# Patient Record
Sex: FEMALE | Race: BLACK OR AFRICAN AMERICAN | NOT HISPANIC OR LATINO | Employment: UNEMPLOYED | URBAN - METROPOLITAN AREA
[De-identification: names, ages, dates, MRNs, and addresses within clinical notes are randomized per-mention and may not be internally consistent; named-entity substitution may affect disease eponyms.]

---

## 2021-07-22 ENCOUNTER — LAB (OUTPATIENT)
Dept: LAB | Facility: HOSPITAL | Age: 21
End: 2021-07-22

## 2021-07-22 ENCOUNTER — OFFICE VISIT (OUTPATIENT)
Dept: INTERNAL MEDICINE | Facility: CLINIC | Age: 21
End: 2021-07-22

## 2021-07-22 VITALS
HEIGHT: 63 IN | OXYGEN SATURATION: 98 % | DIASTOLIC BLOOD PRESSURE: 84 MMHG | TEMPERATURE: 96.8 F | SYSTOLIC BLOOD PRESSURE: 126 MMHG | WEIGHT: 119 LBS | HEART RATE: 68 BPM | RESPIRATION RATE: 16 BRPM | BODY MASS INDEX: 21.09 KG/M2

## 2021-07-22 DIAGNOSIS — Z13.220 LIPID SCREENING: ICD-10-CM

## 2021-07-22 DIAGNOSIS — Z13.21 ENCOUNTER FOR VITAMIN DEFICIENCY SCREENING: ICD-10-CM

## 2021-07-22 DIAGNOSIS — Z13.29 THYROID DISORDER SCREENING: ICD-10-CM

## 2021-07-22 DIAGNOSIS — F41.9 ANXIETY: ICD-10-CM

## 2021-07-22 DIAGNOSIS — G47.09 OTHER INSOMNIA: ICD-10-CM

## 2021-07-22 DIAGNOSIS — Z13.1 SCREENING FOR DIABETES MELLITUS: ICD-10-CM

## 2021-07-22 DIAGNOSIS — R79.89 LOW TSH LEVEL: Primary | ICD-10-CM

## 2021-07-22 DIAGNOSIS — Z13.0 SCREENING FOR BLOOD DISEASE: ICD-10-CM

## 2021-07-22 DIAGNOSIS — F31.62 BIPOLAR 1 DISORDER, MIXED, MODERATE (HCC): Primary | ICD-10-CM

## 2021-07-22 LAB
25(OH)D3 SERPL-MCNC: 24.5 NG/ML (ref 30–100)
ALBUMIN SERPL-MCNC: 4.3 G/DL (ref 3.5–5.2)
ALBUMIN/GLOB SERPL: 1.6 G/DL
ALP SERPL-CCNC: 46 U/L (ref 39–117)
ALT SERPL W P-5'-P-CCNC: 7 U/L (ref 1–33)
ANION GAP SERPL CALCULATED.3IONS-SCNC: 10.2 MMOL/L (ref 5–15)
AST SERPL-CCNC: 16 U/L (ref 1–32)
BILIRUB SERPL-MCNC: 0.5 MG/DL (ref 0–1.2)
BUN SERPL-MCNC: 5 MG/DL (ref 6–20)
BUN/CREAT SERPL: 5.8 (ref 7–25)
CALCIUM SPEC-SCNC: 9.3 MG/DL (ref 8.6–10.5)
CHLORIDE SERPL-SCNC: 102 MMOL/L (ref 98–107)
CHOLEST SERPL-MCNC: 160 MG/DL (ref 0–200)
CO2 SERPL-SCNC: 26.8 MMOL/L (ref 22–29)
CREAT SERPL-MCNC: 0.86 MG/DL (ref 0.57–1)
DEPRECATED RDW RBC AUTO: 47.1 FL (ref 37–54)
ERYTHROCYTE [DISTWIDTH] IN BLOOD BY AUTOMATED COUNT: 13.2 % (ref 12.3–15.4)
FOLATE SERPL-MCNC: 15.2 NG/ML (ref 4.78–24.2)
GFR SERPL CREATININE-BSD FRML MDRD: 101 ML/MIN/1.73
GLOBULIN UR ELPH-MCNC: 2.7 GM/DL
GLUCOSE SERPL-MCNC: 95 MG/DL (ref 65–99)
HBA1C MFR BLD: 5 % (ref 4.8–5.6)
HCT VFR BLD AUTO: 45.7 % (ref 34–46.6)
HDLC SERPL-MCNC: 72 MG/DL (ref 40–60)
HGB BLD-MCNC: 15.5 G/DL (ref 12–15.9)
LDLC SERPL CALC-MCNC: 79 MG/DL (ref 0–100)
LDLC/HDLC SERPL: 1.12 {RATIO}
MCH RBC QN AUTO: 32.5 PG (ref 26.6–33)
MCHC RBC AUTO-ENTMCNC: 33.9 G/DL (ref 31.5–35.7)
MCV RBC AUTO: 95.8 FL (ref 79–97)
PLATELET # BLD AUTO: 189 10*3/MM3 (ref 140–450)
PMV BLD AUTO: 9.9 FL (ref 6–12)
POTASSIUM SERPL-SCNC: 4 MMOL/L (ref 3.5–5.2)
PROT SERPL-MCNC: 7 G/DL (ref 6–8.5)
RBC # BLD AUTO: 4.77 10*6/MM3 (ref 3.77–5.28)
SODIUM SERPL-SCNC: 139 MMOL/L (ref 136–145)
T3FREE SERPL-MCNC: 2.8 PG/ML (ref 2–4.4)
T4 FREE SERPL-MCNC: 1.15 NG/DL (ref 0.93–1.7)
TRIGL SERPL-MCNC: 37 MG/DL (ref 0–150)
TSH SERPL DL<=0.05 MIU/L-ACNC: 0.16 UIU/ML (ref 0.27–4.2)
VIT B12 BLD-MCNC: 401 PG/ML (ref 211–946)
VLDLC SERPL-MCNC: 9 MG/DL (ref 5–40)
WBC # BLD AUTO: 4.31 10*3/MM3 (ref 3.4–10.8)

## 2021-07-22 PROCEDURE — 83036 HEMOGLOBIN GLYCOSYLATED A1C: CPT | Performed by: NURSE PRACTITIONER

## 2021-07-22 PROCEDURE — 84481 FREE ASSAY (FT-3): CPT | Performed by: NURSE PRACTITIONER

## 2021-07-22 PROCEDURE — 80061 LIPID PANEL: CPT | Performed by: NURSE PRACTITIONER

## 2021-07-22 PROCEDURE — 82607 VITAMIN B-12: CPT | Performed by: NURSE PRACTITIONER

## 2021-07-22 PROCEDURE — 82746 ASSAY OF FOLIC ACID SERUM: CPT | Performed by: NURSE PRACTITIONER

## 2021-07-22 PROCEDURE — 82306 VITAMIN D 25 HYDROXY: CPT | Performed by: NURSE PRACTITIONER

## 2021-07-22 PROCEDURE — 80050 GENERAL HEALTH PANEL: CPT | Performed by: NURSE PRACTITIONER

## 2021-07-22 PROCEDURE — 99214 OFFICE O/P EST MOD 30 MIN: CPT | Performed by: NURSE PRACTITIONER

## 2021-07-22 PROCEDURE — 84439 ASSAY OF FREE THYROXINE: CPT | Performed by: NURSE PRACTITIONER

## 2021-07-22 RX ORDER — LAMOTRIGINE 25 MG/1
TABLET ORAL
Qty: 42 TABLET | Refills: 0 | Status: SHIPPED | OUTPATIENT
Start: 2021-07-22 | End: 2021-08-12

## 2021-07-22 RX ORDER — OLANZAPINE 5 MG/1
5 TABLET ORAL NIGHTLY
Qty: 30 TABLET | Refills: 1 | Status: SHIPPED | OUTPATIENT
Start: 2021-07-22 | End: 2021-09-15

## 2021-07-23 RX ORDER — ERGOCALCIFEROL 1.25 MG/1
50000 CAPSULE ORAL WEEKLY
Qty: 4 CAPSULE | Refills: 2 | Status: SHIPPED | OUTPATIENT
Start: 2021-07-23 | End: 2022-07-03 | Stop reason: SDUPTHER

## 2021-07-23 RX ORDER — DIPHENOXYLATE HYDROCHLORIDE AND ATROPINE SULFATE 2.5; .025 MG/1; MG/1
1 TABLET ORAL DAILY
Qty: 30 TABLET | Refills: 11 | Status: SHIPPED | OUTPATIENT
Start: 2021-07-23 | End: 2022-07-03 | Stop reason: SDUPTHER

## 2021-08-09 ENCOUNTER — TELEPHONE (OUTPATIENT)
Dept: INTERNAL MEDICINE | Facility: CLINIC | Age: 21
End: 2021-08-09

## 2021-08-09 NOTE — TELEPHONE ENCOUNTER
Genesight test unable to be resulted, Pt had a Genesight test in June ordered by previous PCP. A signed record release would need to be sent to get results.

## 2021-08-12 ENCOUNTER — TELEMEDICINE (OUTPATIENT)
Dept: INTERNAL MEDICINE | Facility: CLINIC | Age: 21
End: 2021-08-12

## 2021-08-12 DIAGNOSIS — G47.09 OTHER INSOMNIA: ICD-10-CM

## 2021-08-12 DIAGNOSIS — F41.9 ANXIETY: ICD-10-CM

## 2021-08-12 DIAGNOSIS — F31.62 BIPOLAR 1 DISORDER, MIXED, MODERATE (HCC): Primary | ICD-10-CM

## 2021-08-12 DIAGNOSIS — F31.62 BIPOLAR 1 DISORDER, MIXED, MODERATE (HCC): ICD-10-CM

## 2021-08-12 PROCEDURE — 99214 OFFICE O/P EST MOD 30 MIN: CPT | Performed by: NURSE PRACTITIONER

## 2021-08-12 RX ORDER — LAMOTRIGINE 25 MG/1
TABLET ORAL
Qty: 42 TABLET | Refills: 0 | Status: SHIPPED | OUTPATIENT
Start: 2021-08-12 | End: 2022-05-10

## 2021-08-12 NOTE — PROGRESS NOTES
Subjective   Chief Complaint   Patient presents with   • Anxiety   • Depression     This is video visit.  You have chosen to receive care through a video visit today. Do you consent to use a video visit for your medical care today? Yes     Elayne Garibay is a 21 y.o. female here today for anxiety, depression, and insomnia. During last appt she was started on lamictal and zyprexa. Medications have helped with mood stabilization and sadness. She feels she is able to manage better than she was. She has never responded to medications well and cannot find any to work for her. She is sleeping better since stating zyprexa at bedtime. She does not want to change anything or increase the dose at this time. She is currently seeing a therapist and does not want to see psychiatrist. Does not like telling her story over and over and wants to stay with this provider for medications. No thoughts of self harm or harming others.     I have reviewed the following portions of the patient's history and confirmed they are accurate: allergies, current medications, past family history, past medical history, past social history, past surgical history and problem list    I have personally completed the patient's review of systems.    Review of Systems   Constitutional: Negative for activity change, appetite change, chills, diaphoresis, fatigue, fever, unexpected weight gain and unexpected weight loss.   HENT: Negative for ear discharge, ear pain, mouth sores, nosebleeds, sinus pressure, sneezing and sore throat.    Eyes: Negative for pain, discharge and itching.   Respiratory: Negative for cough, chest tightness, shortness of breath and wheezing.    Cardiovascular: Negative for chest pain, palpitations and leg swelling.   Gastrointestinal: Negative for abdominal pain, constipation, diarrhea, nausea and vomiting.   Endocrine: Negative for heat intolerance, polydipsia and polyphagia.   Genitourinary: Negative for dysuria, flank pain,  frequency, hematuria and urgency.   Musculoskeletal: Negative for arthralgias, back pain, gait problem, joint swelling, myalgias, neck pain and neck stiffness.   Skin: Negative for color change, pallor and rash.   Allergic/Immunologic: Negative for immunocompromised state.   Neurological: Negative for seizures, speech difficulty, weakness and numbness.   Hematological: Negative for adenopathy.   Psychiatric/Behavioral: Positive for depressed mood and stress. Negative for agitation, behavioral problems, decreased concentration, dysphoric mood, hallucinations, self-injury, sleep disturbance, suicidal ideas and negative for hyperactivity. The patient is nervous/anxious.        Current Outpatient Medications on File Prior to Visit   Medication Sig   • multivitamin (Multiple Vitamin) tablet tablet Take 1 tablet by mouth Daily.   • OLANZapine (ZyPREXA) 5 MG tablet Take 1 tablet by mouth Every Night.   • vitamin D (ERGOCALCIFEROL) 1.25 MG (96477 UT) capsule capsule Take 1 capsule by mouth 1 (One) Time Per Week.     No current facility-administered medications on file prior to visit.       Objective   There were no vitals filed for this visit.  There is no height or weight on file to calculate BMI.    Physical Exam  Constitutional:       Appearance: Normal appearance. She is well-developed.   HENT:      Head: Normocephalic and atraumatic.      Nose: Nose normal.   Eyes:      General: Lids are normal.      Conjunctiva/sclera: Conjunctivae normal.   Neck:      Trachea: Trachea normal.   Pulmonary:      Effort: No respiratory distress.   Neurological:      Mental Status: She is alert and oriented to person, place, and time.      GCS: GCS eye subscore is 4. GCS verbal subscore is 5. GCS motor subscore is 6.   Psychiatric:         Attention and Perception: Attention normal.         Mood and Affect: Mood normal.         Speech: Speech normal.         Behavior: Behavior normal. Behavior is cooperative.         Thought Content:  Thought content normal.         Assessment/Plan   Problem List Items Addressed This Visit        Mental Health    Bipolar 1 disorder, mixed, moderate (CMS/HCC) - Primary  Chronic issue unstable requiring medication management and monitoring. Will eat well balanced diet, increase water intake, increase physical activity during the day, and get adequate rest. Discussed relaxation and coping skills and exercises.   Continue lamictal and zyprexa at bedtime. Consider increasing lamictal if mood becomes unstable.       Anxiety  Chronic issue unstable requiring medication management and monitoring. Will eat well balanced diet, increase water intake, increase physical activity during the day, and get adequate rest. Discussed relaxation and coping skills and exercises.   Continue zyprexa and lamictal. Continue behavioral therapy.          Sleep    Other insomnia  Chronic issue stable requiring medication management and monitoring. Will eat well balanced diet, drink adequate water decreasing caffeine intake, and increase physical activity during the day. Discussed relaxation and coping skills and exercises. Discussed sleep hygiene and limiting electronic devices prior to bedtime.    Continue zyprexa at bedtime. Discussed adding OTC melatonin PRN.              Current Outpatient Medications:   •  lamoTRIgine (LaMICtal) 25 MG tablet, TAKE 1 TABLET BY MOUTH ONCE DAILY FOR 2 WEEKS, THEN INCREASE TO 2 TABLETS ONCE DAILY., Disp: 42 tablet, Rfl: 0  •  multivitamin (Multiple Vitamin) tablet tablet, Take 1 tablet by mouth Daily., Disp: 30 tablet, Rfl: 11  •  OLANZapine (ZyPREXA) 5 MG tablet, Take 1 tablet by mouth Every Night., Disp: 30 tablet, Rfl: 1  •  vitamin D (ERGOCALCIFEROL) 1.25 MG (41742 UT) capsule capsule, Take 1 capsule by mouth 1 (One) Time Per Week., Disp: 4 capsule, Rfl: 2       Plan of care reviewed with the patient at the conclusion of today's visit.  Education was provided regarding diagnosis, management, and any  prescribed or recommended OTC medications.  Patient verbalized understanding of and agreement with management plan.     Return in about 1 month (around 9/12/2021), or if symptoms worsen or fail to improve.     I spent 25 minutes in medical discussion with patient during this visit.     JUMA Melendez    Please note that portions of this note were completed with a voice recognition program. Efforts were made to edit the dictations, but occasionally words are mistranscribed.

## 2021-09-15 DIAGNOSIS — F41.9 ANXIETY: ICD-10-CM

## 2021-09-15 DIAGNOSIS — G47.09 OTHER INSOMNIA: ICD-10-CM

## 2021-09-15 DIAGNOSIS — F31.62 BIPOLAR 1 DISORDER, MIXED, MODERATE (HCC): ICD-10-CM

## 2021-09-15 RX ORDER — OLANZAPINE 5 MG/1
TABLET ORAL
Qty: 30 TABLET | Refills: 1 | Status: SHIPPED | OUTPATIENT
Start: 2021-09-15 | End: 2022-05-10

## 2022-05-10 ENCOUNTER — OFFICE VISIT (OUTPATIENT)
Dept: INTERNAL MEDICINE | Facility: CLINIC | Age: 22
End: 2022-05-10

## 2022-05-10 ENCOUNTER — LAB (OUTPATIENT)
Dept: LAB | Facility: HOSPITAL | Age: 22
End: 2022-05-10

## 2022-05-10 VITALS
OXYGEN SATURATION: 99 % | BODY MASS INDEX: 23.21 KG/M2 | TEMPERATURE: 98.4 F | DIASTOLIC BLOOD PRESSURE: 62 MMHG | SYSTOLIC BLOOD PRESSURE: 109 MMHG | WEIGHT: 131 LBS | HEIGHT: 63 IN | HEART RATE: 58 BPM | RESPIRATION RATE: 16 BRPM

## 2022-05-10 DIAGNOSIS — R41.89 CHANGES IN CONSCIOUSNESS: ICD-10-CM

## 2022-05-10 DIAGNOSIS — F31.62 BIPOLAR 1 DISORDER, MIXED, MODERATE: Primary | ICD-10-CM

## 2022-05-10 DIAGNOSIS — R41.0 CONFUSION: ICD-10-CM

## 2022-05-10 DIAGNOSIS — R79.89 LOW TSH LEVEL: ICD-10-CM

## 2022-05-10 DIAGNOSIS — R40.4 STARING EPISODES: ICD-10-CM

## 2022-05-10 DIAGNOSIS — Z79.899 HIGH RISK MEDICATION USE: ICD-10-CM

## 2022-05-10 PROBLEM — F29 PSYCHOSIS: Status: ACTIVE | Noted: 2022-02-16

## 2022-05-10 LAB
BASOPHILS # BLD AUTO: 0.02 10*3/MM3 (ref 0–0.2)
BASOPHILS NFR BLD AUTO: 0.5 % (ref 0–1.5)
DEPRECATED RDW RBC AUTO: 40.3 FL (ref 37–54)
EOSINOPHIL # BLD AUTO: 0.14 10*3/MM3 (ref 0–0.4)
EOSINOPHIL NFR BLD AUTO: 3.7 % (ref 0.3–6.2)
ERYTHROCYTE [DISTWIDTH] IN BLOOD BY AUTOMATED COUNT: 12.3 % (ref 12.3–15.4)
HCT VFR BLD AUTO: 39.5 % (ref 34–46.6)
HGB BLD-MCNC: 13.3 G/DL (ref 12–15.9)
IMM GRANULOCYTES # BLD AUTO: 0.01 10*3/MM3 (ref 0–0.05)
IMM GRANULOCYTES NFR BLD AUTO: 0.3 % (ref 0–0.5)
LITHIUM SERPL-SCNC: 0.5 MMOL/L (ref 0.6–1.2)
LYMPHOCYTES # BLD AUTO: 1.88 10*3/MM3 (ref 0.7–3.1)
LYMPHOCYTES NFR BLD AUTO: 49.6 % (ref 19.6–45.3)
MCH RBC QN AUTO: 30.8 PG (ref 26.6–33)
MCHC RBC AUTO-ENTMCNC: 33.7 G/DL (ref 31.5–35.7)
MCV RBC AUTO: 91.4 FL (ref 79–97)
MONOCYTES # BLD AUTO: 0.28 10*3/MM3 (ref 0.1–0.9)
MONOCYTES NFR BLD AUTO: 7.4 % (ref 5–12)
NEUTROPHILS NFR BLD AUTO: 1.46 10*3/MM3 (ref 1.7–7)
NEUTROPHILS NFR BLD AUTO: 38.5 % (ref 42.7–76)
NRBC BLD AUTO-RTO: 0 /100 WBC (ref 0–0.2)
PLATELET # BLD AUTO: 175 10*3/MM3 (ref 140–450)
PMV BLD AUTO: 10 FL (ref 6–12)
RBC # BLD AUTO: 4.32 10*6/MM3 (ref 3.77–5.28)
WBC NRBC COR # BLD: 3.79 10*3/MM3 (ref 3.4–10.8)

## 2022-05-10 PROCEDURE — 80050 GENERAL HEALTH PANEL: CPT | Performed by: NURSE PRACTITIONER

## 2022-05-10 PROCEDURE — 82746 ASSAY OF FOLIC ACID SERUM: CPT | Performed by: NURSE PRACTITIONER

## 2022-05-10 PROCEDURE — 84481 FREE ASSAY (FT-3): CPT | Performed by: NURSE PRACTITIONER

## 2022-05-10 PROCEDURE — 80178 ASSAY OF LITHIUM: CPT | Performed by: NURSE PRACTITIONER

## 2022-05-10 PROCEDURE — 99214 OFFICE O/P EST MOD 30 MIN: CPT | Performed by: NURSE PRACTITIONER

## 2022-05-10 PROCEDURE — 84439 ASSAY OF FREE THYROXINE: CPT | Performed by: NURSE PRACTITIONER

## 2022-05-10 PROCEDURE — 82607 VITAMIN B-12: CPT | Performed by: NURSE PRACTITIONER

## 2022-05-10 PROCEDURE — 82306 VITAMIN D 25 HYDROXY: CPT | Performed by: NURSE PRACTITIONER

## 2022-05-10 RX ORDER — LITHIUM CARBONATE 300 MG/1
300 TABLET, FILM COATED, EXTENDED RELEASE ORAL 2 TIMES DAILY
COMMUNITY
Start: 2022-04-04 | End: 2023-01-19

## 2022-05-10 RX ORDER — RISPERIDONE 2 MG/1
2 TABLET ORAL
COMMUNITY
Start: 2022-04-04 | End: 2023-01-19

## 2022-05-10 RX ORDER — LUMATEPERONE 42 MG/1
1 CAPSULE ORAL DAILY
COMMUNITY
Start: 2022-05-06 | End: 2023-01-19

## 2022-05-10 NOTE — PROGRESS NOTES
"Subjective   Chief Complaint   Patient presents with   • Loss of Consciousness     Pt has not had lithuim level checked recently   • Behavioral Health     \"I would like a referral for outpatient that accepts my insurance\"      Elayne Garibay is a 22 y.o. female.   The patient is here today for loss of consciousness and would like a behavioral health referral. She is accompanied by her mother.     Loss of consciousness  She explains feeling an aura that comes over her and then she becomes weak, until she almost falls to the ground. She denies losing consciousness but describes she is unable to speak or react when these episodes happen. The patient's mother describes the patient looking disoriented when these episodes occur and states she is unresponsive. At first, the patient believed she was having a seizure. These episodes have happened in the past a few times intermittently. She attributed these to situations of extreme stress, the most recent episode happening after there was a wasp in the home she was running away from.     Behavioral health  The patient has a history of Bipolar I disorder and psychosis. She is no longer taking Lamictal or Zyprexa willingly in 2021, and was started on new medication. The patient is now taking Caplyta, lithium, and Risperdal, all prescribed by a psychiatrist with [facility]. She started taking these medications approximately 1 month ago. Since starting her new medications she has not been able to tell a difference in her symptoms; however, admits she forgets to take them occasionally. Of note, she takes her new medications more times than not. She denies her psychiatrist monitoring her lithium levels regularly. She does not have a follow-up appointment with her psychiatrist but has her phone number in case she needs anything. She denies any suicidal ideations at this time. The patient would like a referral to see a new behavioral health provider for more consistent " care. Her mother would like her to been seen by a behavioral health psychiatrist and a therapist in-person. The patient is requesting to have her head examined further for behavioral health issues. Of note, she reports being claustrophobic.     I have reviewed the following portions of the patient's history and confirmed they are accurate: allergies, current medications, past family history, past medical history, past social history, past surgical history, and problem list    I have personally completed the patient's review of systems.    Review of Systems   Constitutional: Positive for activity change. Negative for appetite change, chills, diaphoresis, fatigue, fever, unexpected weight gain and unexpected weight loss.   HENT: Negative for ear discharge, ear pain, mouth sores, nosebleeds, sinus pressure, sneezing and sore throat.    Eyes: Negative for pain, discharge and itching.   Respiratory: Negative for cough, chest tightness, shortness of breath and wheezing.    Cardiovascular: Negative for chest pain, palpitations and leg swelling.   Gastrointestinal: Negative for abdominal pain, constipation, diarrhea, nausea and vomiting.   Endocrine: Negative for heat intolerance, polydipsia and polyphagia.   Genitourinary: Negative for dysuria, flank pain, frequency, hematuria and urgency.   Musculoskeletal: Negative for arthralgias, back pain, gait problem, joint swelling, myalgias, neck pain and neck stiffness.   Skin: Negative for color change, pallor and rash.   Allergic/Immunologic: Negative for immunocompromised state.   Neurological: Positive for weakness, memory problem and confusion. Negative for seizures, speech difficulty and numbness.   Hematological: Negative for adenopathy.   Psychiatric/Behavioral: Positive for depressed mood and stress. Negative for agitation, behavioral problems, decreased concentration, dysphoric mood, hallucinations, self-injury, sleep disturbance, suicidal ideas and negative for  "hyperactivity. The patient is nervous/anxious.        Current Outpatient Medications on File Prior to Visit   Medication Sig   • Caplyta 42 MG capsule Take 1 capsule by mouth Daily.   • lithium (LITHOBID) 300 MG CR tablet Take 300 mg by mouth 2 (Two) Times a Day.   • risperiDONE (risperDAL) 2 MG tablet Take 2 mg by mouth every night at bedtime.   • [DISCONTINUED] lamoTRIgine (LaMICtal) 25 MG tablet TAKE 1 TABLET BY MOUTH ONCE DAILY FOR 2 WEEKS, THEN INCREASE TO 2 TABLETS ONCE DAILY.   • multivitamin (Multiple Vitamin) tablet tablet Take 1 tablet by mouth Daily.   • vitamin D (ERGOCALCIFEROL) 1.25 MG (32682 UT) capsule capsule Take 1 capsule by mouth 1 (One) Time Per Week.   • [DISCONTINUED] OLANZapine (zyPREXA) 5 MG tablet TAKE 1 TABLET BY MOUTH EVERY DAY AT NIGHT     No current facility-administered medications on file prior to visit.       Objective   Vitals:    05/10/22 0925   BP: 109/62   Pulse: 58   Resp: 16   Temp: 98.4 °F (36.9 °C)   TempSrc: Temporal   SpO2: 99%   Weight: 59.4 kg (131 lb)   Height: 160 cm (63\")     Body mass index is 23.21 kg/m².    Physical Exam  Vitals reviewed.   Constitutional:       Appearance: Normal appearance. She is well-developed.   HENT:      Head: Normocephalic and atraumatic.      Nose: Nose normal.   Eyes:      General: Lids are normal.      Conjunctiva/sclera: Conjunctivae normal.      Pupils: Pupils are equal, round, and reactive to light.   Neck:      Thyroid: No thyromegaly.      Trachea: Trachea normal.   Pulmonary:      Effort: Pulmonary effort is normal. No respiratory distress.   Skin:     General: Skin is warm and dry.   Neurological:      Mental Status: She is alert and oriented to person, place, and time.      GCS: GCS eye subscore is 4. GCS verbal subscore is 5. GCS motor subscore is 6.   Psychiatric:         Attention and Perception: She is inattentive.         Mood and Affect: Mood normal. Affect is flat.         Behavior: Behavior normal. Behavior is cooperative. "         Cognition and Memory: Memory is impaired.      Comments: Slow and pressure speech     Today patient appears very lethargic, inattentive with pressured speech    [unfilled]  Problem List Items Addressed This Visit        Mental Health    Bipolar 1 disorder, mixed, moderate (HCC) - Primary    Relevant Medications    Caplyta 42 MG capsule    risperiDONE (risperDAL) 2 MG tablet    lithium (LITHOBID) 300 MG CR tablet    Other Relevant Orders    Ambulatory Referral to Psychiatry (Completed)      Other Visit Diagnoses     Confusion        Relevant Medications    Caplyta 42 MG capsule    risperiDONE (risperDAL) 2 MG tablet    lithium (LITHOBID) 300 MG CR tablet    Other Relevant Orders    MRI Brain Without Contrast    Ambulatory Referral to Neurology (Completed)    Changes in consciousness        Relevant Orders    CBC Auto Differential    Comprehensive Metabolic Panel    TSH Rfx On Abnormal To Free T4    Vitamin B12 & Folate    Vitamin D 25 Hydroxy    Lithium level    MRI Brain Without Contrast    Ambulatory Referral to Neurology (Completed)    Staring episodes        Relevant Orders    MRI Brain Without Contrast    Ambulatory Referral to Neurology (Completed)    High risk medication use        Relevant Orders    Lithium level         1. Bipolar 1 disorder  - Chronic, unstable.   - Patient will continue Risperdal, lithium, and Caplyta.   - Referring to behavioral health for psychiatry and therapy.   - Educated and discussed with patient importance of taking her medication daily and not missing doses.     2. Confusion  - New, unstable.   - Ordering brain MRI and referring to neurology.   - Confusion and symptoms could be related to psychiatric illness and not being consistent with her medications.  - Labs ordered.     3. Changes in consciousness   - New, unstable.   - Ordering brain MRI and referring to neurology.   - Confusion and symptoms could be related to psychiatric illness and not being consistent  with her medications.    4. Staring episodes  - New, unstable.   - Ordering brain MRI and referring to neurology.   - Confusion and symptoms could be related to psychiatric illness and not being consistent with her medications.        Current Outpatient Medications:   •  Caplyta 42 MG capsule, Take 1 capsule by mouth Daily., Disp: , Rfl:   •  lithium (LITHOBID) 300 MG CR tablet, Take 300 mg by mouth 2 (Two) Times a Day., Disp: , Rfl:   •  risperiDONE (risperDAL) 2 MG tablet, Take 2 mg by mouth every night at bedtime., Disp: , Rfl:   •  multivitamin (Multiple Vitamin) tablet tablet, Take 1 tablet by mouth Daily., Disp: 30 tablet, Rfl: 11  •  vitamin D (ERGOCALCIFEROL) 1.25 MG (38271 UT) capsule capsule, Take 1 capsule by mouth 1 (One) Time Per Week., Disp: 4 capsule, Rfl: 2       Plan of care reviewed with the patient at the conclusion of today's visit.  Education was provided regarding diagnosis, management, and any prescribed or recommended OTC medications.  Patient verbalized understanding of and agreement with management plan.     No follow-ups on file.      Transcribed from ambient dictation for JUMA Melendez by Mae Asencio.   05/10/22   09:47 EDT    Patient verbalized consent to the visit recording.

## 2022-05-11 DIAGNOSIS — R79.89 LOW TSH LEVEL: Primary | ICD-10-CM

## 2022-05-11 LAB
25(OH)D3 SERPL-MCNC: 27.8 NG/ML (ref 30–100)
ALBUMIN SERPL-MCNC: 3.7 G/DL (ref 3.5–5.2)
ALBUMIN/GLOB SERPL: 1.4 G/DL
ALP SERPL-CCNC: 42 U/L (ref 39–117)
ALT SERPL W P-5'-P-CCNC: 9 U/L (ref 1–33)
ANION GAP SERPL CALCULATED.3IONS-SCNC: 13.5 MMOL/L (ref 5–15)
AST SERPL-CCNC: 14 U/L (ref 1–32)
BILIRUB SERPL-MCNC: 0.2 MG/DL (ref 0–1.2)
BUN SERPL-MCNC: 6 MG/DL (ref 6–20)
BUN/CREAT SERPL: 7.4 (ref 7–25)
CALCIUM SPEC-SCNC: 9.4 MG/DL (ref 8.6–10.5)
CHLORIDE SERPL-SCNC: 103 MMOL/L (ref 98–107)
CO2 SERPL-SCNC: 22.5 MMOL/L (ref 22–29)
CREAT SERPL-MCNC: 0.81 MG/DL (ref 0.57–1)
EGFRCR SERPLBLD CKD-EPI 2021: 105.4 ML/MIN/1.73
FOLATE SERPL-MCNC: 17.7 NG/ML (ref 4.78–24.2)
GLOBULIN UR ELPH-MCNC: 2.7 GM/DL
GLUCOSE SERPL-MCNC: 74 MG/DL (ref 65–99)
POTASSIUM SERPL-SCNC: 4.4 MMOL/L (ref 3.5–5.2)
PROT SERPL-MCNC: 6.4 G/DL (ref 6–8.5)
SODIUM SERPL-SCNC: 139 MMOL/L (ref 136–145)
T3FREE SERPL-MCNC: 2.42 PG/ML (ref 2–4.4)
T4 FREE SERPL-MCNC: 1.08 NG/DL (ref 0.93–1.7)
TSH SERPL DL<=0.05 MIU/L-ACNC: 0.11 UIU/ML (ref 0.27–4.2)
VIT B12 BLD-MCNC: 371 PG/ML (ref 211–946)

## 2022-05-18 ENCOUNTER — APPOINTMENT (OUTPATIENT)
Dept: MRI IMAGING | Facility: HOSPITAL | Age: 22
End: 2022-05-18

## 2022-05-27 ENCOUNTER — HOSPITAL ENCOUNTER (OUTPATIENT)
Dept: MRI IMAGING | Facility: HOSPITAL | Age: 22
Discharge: HOME | End: 2022-05-27
Admitting: NURSE PRACTITIONER

## 2022-05-27 DIAGNOSIS — R40.4 STARING EPISODES: ICD-10-CM

## 2022-05-27 DIAGNOSIS — R41.0 CONFUSION: ICD-10-CM

## 2022-05-27 DIAGNOSIS — R41.89 CHANGES IN CONSCIOUSNESS: ICD-10-CM

## 2022-05-27 PROCEDURE — 70551 MRI BRAIN STEM W/O DYE: CPT

## 2022-06-15 ENCOUNTER — TELEPHONE (OUTPATIENT)
Dept: INTERNAL MEDICINE | Facility: CLINIC | Age: 22
End: 2022-06-15

## 2022-06-17 ENCOUNTER — TELEPHONE (OUTPATIENT)
Dept: INTERNAL MEDICINE | Facility: CLINIC | Age: 22
End: 2022-06-17

## 2022-06-17 NOTE — TELEPHONE ENCOUNTER
Caller: Elayne Garibay    Relationship: Self    Best call back number: [phone]   Caller requesting test results: PATIENT    What test was performed: MRI  When was the test performed: 05.27.22    Where was the test performed:     Additional notes: PLEASE CALL WITH RESULTS

## 2022-07-03 RX ORDER — ERGOCALCIFEROL 1.25 MG/1
50000 CAPSULE ORAL WEEKLY
Qty: 4 CAPSULE | Refills: 5 | Status: SHIPPED | OUTPATIENT
Start: 2022-07-03 | End: 2022-12-27

## 2022-07-03 RX ORDER — DIPHENOXYLATE HYDROCHLORIDE AND ATROPINE SULFATE 2.5; .025 MG/1; MG/1
1 TABLET ORAL DAILY
Qty: 30 TABLET | Refills: 11 | Status: SHIPPED | OUTPATIENT
Start: 2022-07-03 | End: 2023-01-19

## 2022-12-20 ENCOUNTER — LAB (OUTPATIENT)
Dept: LAB | Facility: HOSPITAL | Age: 22
End: 2022-12-20

## 2022-12-20 ENCOUNTER — OFFICE VISIT (OUTPATIENT)
Dept: INTERNAL MEDICINE | Facility: CLINIC | Age: 22
End: 2022-12-20
Payer: MEDICAID

## 2022-12-20 VITALS
DIASTOLIC BLOOD PRESSURE: 70 MMHG | HEIGHT: 63 IN | RESPIRATION RATE: 16 BRPM | BODY MASS INDEX: 24.8 KG/M2 | OXYGEN SATURATION: 100 % | SYSTOLIC BLOOD PRESSURE: 121 MMHG | TEMPERATURE: 98.4 F | WEIGHT: 140 LBS | HEART RATE: 66 BPM

## 2022-12-20 DIAGNOSIS — E05.90 HYPERTHYROIDISM: ICD-10-CM

## 2022-12-20 DIAGNOSIS — F31.62 BIPOLAR 1 DISORDER, MIXED, MODERATE: Primary | ICD-10-CM

## 2022-12-20 DIAGNOSIS — R53.83 OTHER FATIGUE: ICD-10-CM

## 2022-12-20 LAB
BASOPHILS # BLD AUTO: 0.03 10*3/MM3 (ref 0–0.2)
BASOPHILS NFR BLD AUTO: 0.6 % (ref 0–1.5)
DEPRECATED RDW RBC AUTO: 44.3 FL (ref 37–54)
EOSINOPHIL # BLD AUTO: 0.08 10*3/MM3 (ref 0–0.4)
EOSINOPHIL NFR BLD AUTO: 1.5 % (ref 0.3–6.2)
ERYTHROCYTE [DISTWIDTH] IN BLOOD BY AUTOMATED COUNT: 12.9 % (ref 12.3–15.4)
HCT VFR BLD AUTO: 40.1 % (ref 34–46.6)
HGB BLD-MCNC: 13.8 G/DL (ref 12–15.9)
IMM GRANULOCYTES # BLD AUTO: 0 10*3/MM3 (ref 0–0.05)
IMM GRANULOCYTES NFR BLD AUTO: 0 % (ref 0–0.5)
LYMPHOCYTES # BLD AUTO: 2.79 10*3/MM3 (ref 0.7–3.1)
LYMPHOCYTES NFR BLD AUTO: 51.6 % (ref 19.6–45.3)
MCH RBC QN AUTO: 31.6 PG (ref 26.6–33)
MCHC RBC AUTO-ENTMCNC: 34.4 G/DL (ref 31.5–35.7)
MCV RBC AUTO: 91.8 FL (ref 79–97)
MONOCYTES # BLD AUTO: 0.42 10*3/MM3 (ref 0.1–0.9)
MONOCYTES NFR BLD AUTO: 7.8 % (ref 5–12)
NEUTROPHILS NFR BLD AUTO: 2.09 10*3/MM3 (ref 1.7–7)
NEUTROPHILS NFR BLD AUTO: 38.5 % (ref 42.7–76)
NRBC BLD AUTO-RTO: 0 /100 WBC (ref 0–0.2)
PLATELET # BLD AUTO: 189 10*3/MM3 (ref 140–450)
PMV BLD AUTO: 10.2 FL (ref 6–12)
RBC # BLD AUTO: 4.37 10*6/MM3 (ref 3.77–5.28)
WBC NRBC COR # BLD: 5.41 10*3/MM3 (ref 3.4–10.8)

## 2022-12-20 PROCEDURE — 99214 OFFICE O/P EST MOD 30 MIN: CPT | Performed by: NURSE PRACTITIONER

## 2022-12-20 PROCEDURE — 86800 THYROGLOBULIN ANTIBODY: CPT | Performed by: NURSE PRACTITIONER

## 2022-12-20 PROCEDURE — 82746 ASSAY OF FOLIC ACID SERUM: CPT | Performed by: NURSE PRACTITIONER

## 2022-12-20 PROCEDURE — 82607 VITAMIN B-12: CPT | Performed by: NURSE PRACTITIONER

## 2022-12-20 PROCEDURE — 84439 ASSAY OF FREE THYROXINE: CPT | Performed by: NURSE PRACTITIONER

## 2022-12-20 PROCEDURE — 82306 VITAMIN D 25 HYDROXY: CPT | Performed by: NURSE PRACTITIONER

## 2022-12-20 PROCEDURE — 80050 GENERAL HEALTH PANEL: CPT | Performed by: NURSE PRACTITIONER

## 2022-12-20 PROCEDURE — 84481 FREE ASSAY (FT-3): CPT | Performed by: NURSE PRACTITIONER

## 2022-12-20 PROCEDURE — 86376 MICROSOMAL ANTIBODY EACH: CPT | Performed by: NURSE PRACTITIONER

## 2022-12-20 RX ORDER — PALIPERIDONE PALMITATE 234 MG/1.5ML
INJECTION INTRAMUSCULAR
COMMUNITY
Start: 2022-12-06

## 2022-12-20 NOTE — PROGRESS NOTES
Subjective   Chief Complaint   Patient presents with   • Manic Behavior      Elayne Garibay is a 22 y.o. female.     The patient is here today for a follow-up on manic behavior. She is accompanied by her mother.    Bipolar 1 disorder  The patient states that she feels tired since she started the Invega injection. Her mother states that she has had 4 total injections. Her mother states that she feels like the patient is about the same. Her mother states that the patient is very lethargic. The patient's mother states that the patient is not on any of the lithium anymore. The patient's mother states that the patient has a therapist, but not a psychiatrist. The patient's mother states that they have just been giving the patient appointments to come back and get the Invega injection. The patient's mother states that they have an appointment with a psychiatrist on 01/29/2022. The patient states that she sleeps mostly throughout the day. The patient states that she is always very tired. The patient states that she wakes up once or twice throughout the night. The patient states that she is really tired, but it is not really sleep. The patient states that she is in a state of catatonia. The patient states that she is awake, but she is asleep at the same time. The patient states that she is very lethargic. The patient states that she wakes up feeling funny.    I have reviewed the following portions of the patient's history and confirmed they are accurate: allergies, current medications, past family history, past medical history, past social history, past surgical history, and problem list    I have personally completed the patient's review of systems.    Review of Systems   Constitutional: Positive for activity change. Negative for appetite change, chills, diaphoresis, fatigue, fever, unexpected weight gain and unexpected weight loss.   HENT: Negative for ear discharge, ear pain, mouth sores, nosebleeds, sinus pressure,  sneezing and sore throat.    Eyes: Negative for pain, discharge and itching.   Respiratory: Negative for cough, chest tightness, shortness of breath and wheezing.    Cardiovascular: Negative for chest pain, palpitations and leg swelling.   Gastrointestinal: Negative for abdominal pain, constipation, diarrhea, nausea and vomiting.   Endocrine: Negative for heat intolerance, polydipsia and polyphagia.   Genitourinary: Negative for dysuria, flank pain, frequency, hematuria and urgency.   Musculoskeletal: Negative for arthralgias, back pain, gait problem, joint swelling, myalgias, neck pain and neck stiffness.   Skin: Negative for color change, pallor and rash.   Allergic/Immunologic: Negative for immunocompromised state.   Neurological: Positive for weakness, memory problem and confusion. Negative for seizures, speech difficulty and numbness.   Hematological: Negative for adenopathy.   Psychiatric/Behavioral: Positive for depressed mood and stress. Negative for agitation, behavioral problems, decreased concentration, dysphoric mood, hallucinations, self-injury, sleep disturbance, suicidal ideas and negative for hyperactivity. The patient is nervous/anxious.        Current Outpatient Medications on File Prior to Visit   Medication Sig   • Caplyta 42 MG capsule Take 1 capsule by mouth Daily.   • Invega Sustenna 234 MG/1.5ML suspension prefilled syringe IM injection    • multivitamin (Multiple Vitamin) tablet tablet Take 1 tablet by mouth Daily.   • risperiDONE (risperDAL) 2 MG tablet Take 2 mg by mouth every night at bedtime.   • vitamin D (ERGOCALCIFEROL) 1.25 MG (94261 UT) capsule capsule Take 1 capsule by mouth 1 (One) Time Per Week. (Patient taking differently: Take  by mouth 1 (One) Time Per Week.)   • lithium (LITHOBID) 300 MG CR tablet Take 300 mg by mouth 2 (Two) Times a Day.     No current facility-administered medications on file prior to visit.       Objective   Vitals:    12/20/22 1437   BP: 121/70   Pulse:  "66   Resp: 16   Temp: 98.4 °F (36.9 °C)   TempSrc: Tympanic   SpO2: 100%   Weight: 63.5 kg (140 lb)   Height: 160 cm (63\")     Body mass index is 24.8 kg/m².    Physical Exam  Vitals reviewed.   Constitutional:       Appearance: Normal appearance. She is well-developed.   HENT:      Head: Normocephalic and atraumatic.      Nose: Nose normal.   Eyes:      General: Lids are normal.      Conjunctiva/sclera: Conjunctivae normal.      Pupils: Pupils are equal, round, and reactive to light.   Neck:      Thyroid: No thyromegaly.      Trachea: Trachea normal.   Pulmonary:      Effort: Pulmonary effort is normal. No respiratory distress.   Skin:     General: Skin is warm and dry.   Neurological:      Mental Status: She is alert and oriented to person, place, and time.      GCS: GCS eye subscore is 4. GCS verbal subscore is 5. GCS motor subscore is 6.   Psychiatric:         Attention and Perception: She is inattentive.         Mood and Affect: Mood normal. Affect is flat.         Behavior: Behavior normal. Behavior is cooperative.         Cognition and Memory: Memory is impaired.      Comments: Slow and pressure speech         Assessment & Plan   Problem List Items Addressed This Visit        Mental Health    Bipolar 1 disorder, mixed, moderate (HCC) - Primary    Relevant Medications    Invega Sustenna 234 MG/1.5ML suspension prefilled syringe IM injection   Other Visit Diagnoses     Hyperthyroidism        Relevant Orders    CBC Auto Differential    Comprehensive Metabolic Panel    T4, Free    T3, Free    TSH    Thyroid Antibodies    Other fatigue        Relevant Orders    CBC Auto Differential    Comprehensive Metabolic Panel    Vitamin B12 & Folate    Vitamin D,25-Hydroxy             Current Outpatient Medications:   •  Caplyta 42 MG capsule, Take 1 capsule by mouth Daily., Disp: , Rfl:   •  Invega Sustenna 234 MG/1.5ML suspension prefilled syringe IM injection, , Disp: , Rfl:   •  multivitamin (Multiple Vitamin) tablet " tablet, Take 1 tablet by mouth Daily., Disp: 30 tablet, Rfl: 11  •  risperiDONE (risperDAL) 2 MG tablet, Take 2 mg by mouth every night at bedtime., Disp: , Rfl:   •  vitamin D (ERGOCALCIFEROL) 1.25 MG (43809 UT) capsule capsule, Take 1 capsule by mouth 1 (One) Time Per Week. (Patient taking differently: Take  by mouth 1 (One) Time Per Week.), Disp: 4 capsule, Rfl: 5  •  lithium (LITHOBID) 300 MG CR tablet, Take 300 mg by mouth 2 (Two) Times a Day., Disp: , Rfl:      Plan:    Bipolar 1 disorder, chronic, unstable. Continue Invega injection. Patient has an upcoming appointment with psychiatrist if this does not go well, she or mom will follow up with this provider and a referral will be placed for a consultation.     Hyperthyroidism, new, unstable completing thyroid panel. Based on results, will refer to Endocrinology.     Fatigue, new, unstable, most likely related to psychiatric medication changes. Completing labs including vitamin panel to rule out possible causes.        Plan of care reviewed with the patient at the conclusion of today's visit.  Education was provided regarding diagnosis, management, and any prescribed or recommended OTC medications.  Patient verbalized understanding of and agreement with management plan.     Return if symptoms worsen or fail to improve.      Transcribed from ambient dictation for JUMA Melendez by JUMA Melendez.  12/20/22   15:09 EST    Transcribed from ambient dictation for JUMA Melendez by Linda Hogan.  12/20/22   17:52 EST    Patient or patient representative verbalized consent to the visit recording.  I have personally performed the services described in this document as transcribed by the above individual, and it is both accurate and complete.

## 2022-12-21 LAB
25(OH)D3 SERPL-MCNC: 23 NG/ML (ref 30–100)
ALBUMIN SERPL-MCNC: 4.1 G/DL (ref 3.5–5.2)
ALBUMIN/GLOB SERPL: 1.7 G/DL
ALP SERPL-CCNC: 46 U/L (ref 39–117)
ALT SERPL W P-5'-P-CCNC: 12 U/L (ref 1–33)
ANION GAP SERPL CALCULATED.3IONS-SCNC: 7 MMOL/L (ref 5–15)
AST SERPL-CCNC: 17 U/L (ref 1–32)
BILIRUB SERPL-MCNC: 0.4 MG/DL (ref 0–1.2)
BUN SERPL-MCNC: 5 MG/DL (ref 6–20)
BUN/CREAT SERPL: 6 (ref 7–25)
CALCIUM SPEC-SCNC: 9.3 MG/DL (ref 8.6–10.5)
CHLORIDE SERPL-SCNC: 102 MMOL/L (ref 98–107)
CO2 SERPL-SCNC: 29 MMOL/L (ref 22–29)
CREAT SERPL-MCNC: 0.84 MG/DL (ref 0.57–1)
EGFRCR SERPLBLD CKD-EPI 2021: 100.9 ML/MIN/1.73
FOLATE SERPL-MCNC: 14.5 NG/ML (ref 4.78–24.2)
GLOBULIN UR ELPH-MCNC: 2.4 GM/DL
GLUCOSE SERPL-MCNC: 97 MG/DL (ref 65–99)
POTASSIUM SERPL-SCNC: 3.7 MMOL/L (ref 3.5–5.2)
PROT SERPL-MCNC: 6.5 G/DL (ref 6–8.5)
SODIUM SERPL-SCNC: 138 MMOL/L (ref 136–145)
T3FREE SERPL-MCNC: 2.86 PG/ML (ref 2–4.4)
T4 FREE SERPL-MCNC: 1.1 NG/DL (ref 0.93–1.7)
TSH SERPL DL<=0.05 MIU/L-ACNC: 0.2 UIU/ML (ref 0.27–4.2)
VIT B12 BLD-MCNC: 430 PG/ML (ref 211–946)

## 2022-12-22 LAB
THYROGLOB AB SERPL-ACNC: <1 IU/ML (ref 0–0.9)
THYROPEROXIDASE AB SERPL-ACNC: 9 IU/ML (ref 0–34)

## 2022-12-26 DIAGNOSIS — Z86.59 HISTORY OF MENTAL DISORDER: ICD-10-CM

## 2022-12-26 DIAGNOSIS — E05.90 SUBCLINICAL HYPERTHYROIDISM: Primary | ICD-10-CM

## 2022-12-27 RX ORDER — ERGOCALCIFEROL 1.25 MG/1
50000 CAPSULE ORAL WEEKLY
Qty: 4 CAPSULE | Refills: 5 | Status: SHIPPED | OUTPATIENT
Start: 2022-12-27 | End: 2023-01-19

## 2023-01-19 ENCOUNTER — OFFICE VISIT (OUTPATIENT)
Dept: ENDOCRINOLOGY | Facility: CLINIC | Age: 23
End: 2023-01-19
Payer: MEDICAID

## 2023-01-19 VITALS
DIASTOLIC BLOOD PRESSURE: 60 MMHG | BODY MASS INDEX: 25.4 KG/M2 | OXYGEN SATURATION: 99 % | WEIGHT: 138 LBS | HEIGHT: 62 IN | SYSTOLIC BLOOD PRESSURE: 110 MMHG | HEART RATE: 64 BPM

## 2023-01-19 DIAGNOSIS — E05.90 SUBCLINICAL HYPERTHYROIDISM: Primary | ICD-10-CM

## 2023-01-19 PROCEDURE — 99203 OFFICE O/P NEW LOW 30 MIN: CPT | Performed by: INTERNAL MEDICINE

## 2023-01-19 NOTE — PROGRESS NOTES
Chief Complaint   Patient presents with   • Hyperthyroidism        New patient who is being seen in consultation regarding hyperthyroidism at the request of No ref. provider found     JENSEN Garibay is a 22 y.o. female who presents for evaluation of hyperthyroidism.     Patient reports that she was made aware of thyroid hormone abnormality after recent labs with PCP in December.  She reported no prior known history of thyroid abnormality.  She denies any family history of thyroid dysfunction.  She reports that she had presented to PCP due to concern for fatigue, cold intolerance.  She has never taken medication to alter thyroid hormone.    Patient denies palpiltations, anxiety.  Patient denies changes in bowel habits.   Patient reports cold intolerance.  Patient denies changes in weight.  Patient denies hair, skin or nail changes.  Patient denies tremor.  Patient reports decreased energy level.    Patient does report that she previously took lithium but states that this medication was discontinued approximately 1 year ago.    Past Medical History:   Diagnosis Date   • Anxiety    • Bipolar 1 disorder (HCC)    • Depression    • Hypothyroidism      History reviewed. No pertinent surgical history.   Family History   Problem Relation Age of Onset   • Hypertension Mother    • Mental illness Maternal Grandmother       Social History     Socioeconomic History   • Marital status: Single   Tobacco Use   • Smoking status: Former     Packs/day: 0.10     Years: 2.00     Pack years: 0.20     Types: Cigarettes   • Smokeless tobacco: Never   • Tobacco comments:     Pt vapes   Substance and Sexual Activity   • Alcohol use: Never   • Drug use: Never      Allergies   Allergen Reactions   • Lactose Unknown (See Comments)      Current Outpatient Medications on File Prior to Visit   Medication Sig Dispense Refill   • Invega Sustenna 234 MG/1.5ML suspension prefilled syringe IM injection      • [DISCONTINUED] Caplyta 42 MG capsule  "Take 1 capsule by mouth Daily.     • [DISCONTINUED] lithium (LITHOBID) 300 MG CR tablet Take 300 mg by mouth 2 (Two) Times a Day.     • [DISCONTINUED] multivitamin (Multiple Vitamin) tablet tablet Take 1 tablet by mouth Daily. 30 tablet 11   • [DISCONTINUED] risperiDONE (risperDAL) 2 MG tablet Take 2 mg by mouth every night at bedtime.     • [DISCONTINUED] vitamin D (ERGOCALCIFEROL) 1.25 MG (95067 UT) capsule capsule Take 1 capsule by mouth 1 (One) Time Per Week. 4 capsule 5     No current facility-administered medications on file prior to visit.        Review of Systems   Constitutional: Negative for unexpected weight gain and unexpected weight loss.   Eyes: Negative for photophobia and visual disturbance.   Cardiovascular: Negative for palpitations.   Gastrointestinal: Negative for constipation and diarrhea.   Endocrine: Positive for cold intolerance. Negative for heat intolerance.   Neurological: Positive for weakness.   Psychiatric/Behavioral: Positive for sleep disturbance.      Vitals:    01/19/23 1412   BP: 110/60   BP Location: Left arm   Patient Position: Sitting   Cuff Size: Adult   Pulse: 64   SpO2: 99%   Weight: 62.6 kg (138 lb)   Height: 157.5 cm (62\")   Body mass index is 25.24 kg/m².     Physical Exam  Vitals reviewed.   Constitutional:       General: She is not in acute distress.     Appearance: Normal appearance.   HENT:      Head: Normocephalic and atraumatic.   Eyes:      General: Lids are normal.   Neck:      Thyroid: No thyromegaly.   Cardiovascular:      Rate and Rhythm: Normal rate and regular rhythm.   Pulmonary:      Effort: Pulmonary effort is normal.      Breath sounds: Normal breath sounds.   Abdominal:      General: Abdomen is flat. Bowel sounds are normal.      Palpations: Abdomen is soft.   Lymphadenopathy:      Cervical: No cervical adenopathy.   Skin:     General: Skin is warm and dry.      Findings: No rash.   Neurological:      General: No focal deficit present.      Mental Status: " She is alert.   Psychiatric:         Mood and Affect: Affect is blunt.         Behavior: Behavior is cooperative.        Labs/Imaging   Latest Reference Range & Units 07/22/21 11:35 02/21/22 06:16 05/10/22 10:16 08/14/22 15:42 12/20/22 15:22   TSH Baseline 0.270 - 4.200 uIU/mL 0.156 (L)  0.107 (L)  0.199 (L)   Free T4 0.93 - 1.70 ng/dL 1.15 1.1 (E) 1.08 1.6 (E) 1.10   T3, Free 2.00 - 4.40 pg/mL 2.80  2.42  2.86   Thyroglobulin Ab 0.0 - 0.9 IU/mL     <1.0   Thyroid Peroxidase Antibody 0 - 34 IU/mL     9   (L): Data is abnormally low  (E): External lab result    Assessment and Plan    Diagnoses and all orders for this visit:    1. Subclinical hyperthyroidism (Primary)  Available prior labs reviewed with patient.  Discussed that subclinical hyperthyroidism has been present dating back to at least July 2021  Available thyroid antibodies, thyroglobulin and TPO were not elevated.  Reviewed symptoms of hyperthyroidism, patient denies presence of any of the symptoms.  Her primary symptomatic concerns, cold intolerance and fatigue, are more typically associated with hypothyroidism.  Discussed that the symptoms are nonspecific and are frequently multifactorial.  Given patient is hyperthyroid on labs dating back to for the past year, this excludes thyroid dysfunction as the cause. She will follow up with her PCP for ongoing investigation of these concerns.  Discussed threshold for treatment of hyperthyroidism with subclinical disease.  Patient expressed she does not desire treatment with methimazole given mild abnormality and lack of symptoms.  Discussed options for further evaluation for causes of subclinical hyperthyroidism including antibody testing for Graves' disease and possible thyroid uptake and scan.  Patient does not want to pursue at this time.  Symptoms of hyperthyroidism were reviewed in detail.  Patient is to contact the clinic in the interim between visits with any concerning changes.  Otherwise, plan for repeat  thyroid function testing in 6 months.     Return in about 6 months (around 7/19/2023) for Next scheduled follow up. The patient was instructed to contact the clinic with any interval questions or concerns.    Yara Solorio MD     Dictated Utilizing Dragon Dictation

## 2024-02-29 ENCOUNTER — LAB (OUTPATIENT)
Dept: INTERNAL MEDICINE | Facility: CLINIC | Age: 24
End: 2024-02-29
Payer: MEDICAID

## 2024-02-29 ENCOUNTER — OFFICE VISIT (OUTPATIENT)
Dept: INTERNAL MEDICINE | Facility: CLINIC | Age: 24
End: 2024-02-29
Payer: MEDICAID

## 2024-02-29 VITALS
HEIGHT: 62 IN | WEIGHT: 154.6 LBS | HEART RATE: 76 BPM | DIASTOLIC BLOOD PRESSURE: 70 MMHG | BODY MASS INDEX: 28.45 KG/M2 | OXYGEN SATURATION: 100 % | SYSTOLIC BLOOD PRESSURE: 116 MMHG

## 2024-02-29 DIAGNOSIS — E05.90 HYPERTHYROIDISM: Primary | ICD-10-CM

## 2024-02-29 DIAGNOSIS — F25.9 SCHIZOAFFECTIVE DISORDER, UNSPECIFIED TYPE: ICD-10-CM

## 2024-02-29 DIAGNOSIS — E05.90 HYPERTHYROIDISM: ICD-10-CM

## 2024-02-29 DIAGNOSIS — E55.9 VITAMIN D DEFICIENCY: ICD-10-CM

## 2024-02-29 DIAGNOSIS — R53.83 OTHER FATIGUE: ICD-10-CM

## 2024-02-29 DIAGNOSIS — Z13.0 SCREENING FOR BLOOD DISEASE: ICD-10-CM

## 2024-02-29 DIAGNOSIS — N91.2 AMENORRHEA: Primary | ICD-10-CM

## 2024-02-29 DIAGNOSIS — Z13.29 THYROID DISORDER SCREENING: ICD-10-CM

## 2024-02-29 DIAGNOSIS — Z13.220 LIPID SCREENING: ICD-10-CM

## 2024-02-29 DIAGNOSIS — Z13.21 ENCOUNTER FOR VITAMIN DEFICIENCY SCREENING: ICD-10-CM

## 2024-02-29 DIAGNOSIS — Z13.1 SCREENING FOR DIABETES MELLITUS: ICD-10-CM

## 2024-02-29 DIAGNOSIS — G47.09 OTHER INSOMNIA: ICD-10-CM

## 2024-02-29 LAB
CHOLEST SERPL-MCNC: 163 MG/DL (ref 0–200)
DEPRECATED RDW RBC AUTO: 42.1 FL (ref 37–54)
ERYTHROCYTE [DISTWIDTH] IN BLOOD BY AUTOMATED COUNT: 12.5 % (ref 12.3–15.4)
HBA1C MFR BLD: 5 % (ref 4.8–5.6)
HCG SERPL QL: NEGATIVE
HCT VFR BLD AUTO: 41.5 % (ref 34–46.6)
HDLC SERPL-MCNC: 58 MG/DL (ref 40–60)
HGB BLD-MCNC: 13.8 G/DL (ref 12–15.9)
LDLC SERPL CALC-MCNC: 97 MG/DL (ref 0–100)
LDLC/HDLC SERPL: 1.69 {RATIO}
MCH RBC QN AUTO: 30.9 PG (ref 26.6–33)
MCHC RBC AUTO-ENTMCNC: 33.3 G/DL (ref 31.5–35.7)
MCV RBC AUTO: 92.8 FL (ref 79–97)
NRBC BLD AUTO-RTO: 0 /100 WBC (ref 0–0.2)
PLATELET # BLD AUTO: 186 10*3/MM3 (ref 140–450)
PMV BLD AUTO: 9.9 FL (ref 6–12)
RBC # BLD AUTO: 4.47 10*6/MM3 (ref 3.77–5.28)
TRIGL SERPL-MCNC: 35 MG/DL (ref 0–150)
VLDLC SERPL-MCNC: 8 MG/DL (ref 5–40)
WBC NRBC COR # BLD AUTO: 4.2 10*3/MM3 (ref 3.4–10.8)

## 2024-02-29 PROCEDURE — 83520 IMMUNOASSAY QUANT NOS NONAB: CPT | Performed by: NURSE PRACTITIONER

## 2024-02-29 PROCEDURE — 1160F RVW MEDS BY RX/DR IN RCRD: CPT | Performed by: NURSE PRACTITIONER

## 2024-02-29 PROCEDURE — 83036 HEMOGLOBIN GLYCOSYLATED A1C: CPT | Performed by: NURSE PRACTITIONER

## 2024-02-29 PROCEDURE — 82746 ASSAY OF FOLIC ACID SERUM: CPT | Performed by: NURSE PRACTITIONER

## 2024-02-29 PROCEDURE — 84439 ASSAY OF FREE THYROXINE: CPT | Performed by: NURSE PRACTITIONER

## 2024-02-29 PROCEDURE — 84703 CHORIONIC GONADOTROPIN ASSAY: CPT | Performed by: NURSE PRACTITIONER

## 2024-02-29 PROCEDURE — 82607 VITAMIN B-12: CPT | Performed by: NURSE PRACTITIONER

## 2024-02-29 PROCEDURE — 85007 BL SMEAR W/DIFF WBC COUNT: CPT | Performed by: NURSE PRACTITIONER

## 2024-02-29 PROCEDURE — 1159F MED LIST DOCD IN RCRD: CPT | Performed by: NURSE PRACTITIONER

## 2024-02-29 PROCEDURE — 36415 COLL VENOUS BLD VENIPUNCTURE: CPT | Performed by: NURSE PRACTITIONER

## 2024-02-29 PROCEDURE — 84445 ASSAY OF TSI GLOBULIN: CPT | Performed by: NURSE PRACTITIONER

## 2024-02-29 PROCEDURE — 80061 LIPID PANEL: CPT | Performed by: NURSE PRACTITIONER

## 2024-02-29 PROCEDURE — 80050 GENERAL HEALTH PANEL: CPT | Performed by: NURSE PRACTITIONER

## 2024-02-29 PROCEDURE — 84480 ASSAY TRIIODOTHYRONINE (T3): CPT | Performed by: NURSE PRACTITIONER

## 2024-02-29 PROCEDURE — 99214 OFFICE O/P EST MOD 30 MIN: CPT | Performed by: NURSE PRACTITIONER

## 2024-02-29 PROCEDURE — 82306 VITAMIN D 25 HYDROXY: CPT | Performed by: NURSE PRACTITIONER

## 2024-02-29 RX ORDER — TRAZODONE HYDROCHLORIDE 100 MG/1
TABLET ORAL
COMMUNITY
Start: 2023-12-28

## 2024-02-29 NOTE — PROGRESS NOTES
"Subjective   Chief Complaint   Patient presents with    Menstrual Problem     Pt states hasn't had her period since sometime in 2023    Sage Garibay is a 23 y.o. female.     The patient is here today for a physical exam. She is accompanied by her mother.    She is fatigued and not feeling well. Her mother thinks it is a side effect of her medication. She believes that her fatigue has been ongoing since she started having mental health problems. However, she has not had her menstrual period for the last 6 months. She has never had a Pap smear in the past. She has been sexually active in the past. Her mensural cycles were not painful but have always been irregular. She denies any vaginal discharge, itching, burning, or pelvic pain.     She is not sleeping more than normal. She receives an injection once a month. She typically feels the same towards the end of the month before having an injection. She does not notice a difference in how she feels from the beginning or the end. She feels she has relief with the injection. She has not had any \"bad\" episodes of her symptoms since her last visit.     The patient is supposed to take vitamin supplements, given a low level of vitamin. She is seeing Dr. Livingston, endocrinologist, and she has missed her appointment on 07/2023. Her cholesterol in 2021 was within normal limits.     Her depression and anxiety are stable. However, as per mother she is not very alert, gets exhausted easily, and not focused always. She continues to follow up with a psychiatrist and the therapist. She is sleeping well with the trazodone. She has been diagnosed with schizoaffective disorder. She denies any refill of trazodone.     The patient is not fasting today, 02/29/2024. She ate some cereal.    She wanted to sign up with [employer] so that she would have transportation to her doctor's appointments.    Note: cannot ride the bus. Medications make her not as alert and focused and concern " "she would get lost on bus etc.     I have reviewed the following portions of the patient's history and confirmed they are accurate: allergies, current medications, past family history, past medical history, past social history, past surgical history, and problem list    Review of Systems  Pertinent items are noted in HPI.     Current Outpatient Medications on File Prior to Visit   Medication Sig    Invega Sustenna 234 MG/1.5ML suspension prefilled syringe IM injection     traZODone (DESYREL) 100 MG tablet      No current facility-administered medications on file prior to visit.       Objective   Vitals:    02/29/24 0943   BP: 116/70   Pulse: 76   SpO2: 100%   Weight: 70.1 kg (154 lb 9.6 oz)   Height: 157.5 cm (62\")     Body mass index is 28.28 kg/m².    Physical Exam  Vitals reviewed.   Constitutional:       Appearance: Normal appearance. She is well-developed.   HENT:      Head: Normocephalic and atraumatic.      Nose: Nose normal.   Eyes:      General: Lids are normal.      Conjunctiva/sclera: Conjunctivae normal.      Pupils: Pupils are equal, round, and reactive to light.   Neck:      Thyroid: No thyromegaly.      Trachea: Trachea normal.   Cardiovascular:      Rate and Rhythm: Normal rate and regular rhythm.      Heart sounds: Normal heart sounds.   Pulmonary:      Effort: Pulmonary effort is normal. No respiratory distress.      Breath sounds: Normal breath sounds.   Skin:     General: Skin is warm and dry.   Neurological:      Mental Status: She is alert and oriented to person, place, and time.      GCS: GCS eye subscore is 4. GCS verbal subscore is 5. GCS motor subscore is 6.   Psychiatric:         Attention and Perception: Attention normal.         Mood and Affect: Mood normal.         Speech: Speech normal.         Behavior: Behavior normal. Behavior is cooperative.         Thought Content: Thought content normal.         Assessment & Plan   Problem List Items Addressed This Visit       Other insomnia    " Psychosis    Relevant Medications    traZODone (DESYREL) 100 MG tablet    Hyperthyroidism    Relevant Orders    Thyroid Stimulating Immunoglobulin (Completed)    Thyrotropin Receptor Antibody (Completed)    T4, Free (Completed)    T3 (Completed)    TSH (Completed)     Other Visit Diagnoses       Amenorrhea    -  Primary    Relevant Orders    Ambulatory Referral to Obstetrics / Gynecology (Completed)    hCG, Serum, Qualitative (Completed)    Other fatigue        Relevant Orders    Comprehensive Metabolic Panel (Completed)    Vitamin B12 & Folate (Completed)    Vitamin D,25-Hydroxy (Completed)    CBC Auto Differential (Completed)    Manual Differential (Completed)    Screening for blood disease        Relevant Orders    Comprehensive Metabolic Panel (Completed)    Lipid Panel (Completed)    Hemoglobin A1c (Completed)    Vitamin B12 & Folate (Completed)    Vitamin D,25-Hydroxy (Completed)    CBC Auto Differential (Completed)    Manual Differential (Completed)    Thyroid disorder screening        Relevant Orders    Thyroid Stimulating Immunoglobulin (Completed)    Thyrotropin Receptor Antibody (Completed)    T4, Free (Completed)    T3 (Completed)    TSH (Completed)    Lipid screening        Relevant Orders    Lipid Panel (Completed)    Encounter for vitamin deficiency screening        Relevant Orders    Vitamin B12 & Folate (Completed)    Vitamin D,25-Hydroxy (Completed)    Screening for diabetes mellitus        Relevant Orders    Hemoglobin A1c (Completed)    Vitamin D deficiency        Relevant Orders    Vitamin D,25-Hydroxy (Completed)           1. Amenorrhea, new unstable.   -Checking CBC, CMP, and hCG qualitative.   -Referring to gynecology.    2. Fatigue, chronic unstable.   -Possibly related to her psychiatric medications.   -Completing CBC, CMP, and checking thyroid panel due to history of hyperthyroidism.   -The patient will eat well-balanced meals, drink adequate amount of fluids, and get adequate amounts of  sleep.    3. Hyperthyroidism, chronic, unstable.   -Checking a thyroid panel.   -Encouraged the patient to follow up with endocrinology.    4. Insomnia, chronic, stable.   -Continue trazodone.    5. Schizoaffective disorder, chronic, stable.   -Continue Invega injection.   -Continue follow-ups with therapist and psychiatrist.    Follow-up  The patient will be called with the test results.       Current Outpatient Medications:     Invega Sustenna 234 MG/1.5ML suspension prefilled syringe IM injection, , Disp: , Rfl:     traZODone (DESYREL) 100 MG tablet, , Disp: , Rfl:        Plan of care reviewed with the patient at the conclusion of today's visit.  Education was provided regarding diagnosis, management, and any prescribed or recommended OTC medications.  Patient verbalized understanding of and agreement with management plan.     Return in about 3 months (around 5/29/2024), or if symptoms worsen or fail to improve, for Follow-up.      Transcribed from ambient dictation for JUMA Melendez by Chauncey Stephenson.  02/29/24   10:10 EST    Patient or patient representative verbalized consent to the visit recording.  I have personally performed the services described in this document as transcribed by the above individual, and it is both accurate and complete.

## 2024-03-01 LAB
25(OH)D3 SERPL-MCNC: 10.4 NG/ML (ref 30–100)
ALBUMIN SERPL-MCNC: 4.1 G/DL (ref 3.5–5.2)
ALBUMIN/GLOB SERPL: 1.8 G/DL
ALP SERPL-CCNC: 49 U/L (ref 39–117)
ALT SERPL W P-5'-P-CCNC: <5 U/L (ref 1–33)
ANION GAP SERPL CALCULATED.3IONS-SCNC: 11.9 MMOL/L (ref 5–15)
AST SERPL-CCNC: 15 U/L (ref 1–32)
BILIRUB SERPL-MCNC: 0.2 MG/DL (ref 0–1.2)
BUN SERPL-MCNC: 7 MG/DL (ref 6–20)
BUN/CREAT SERPL: 8.4 (ref 7–25)
CALCIUM SPEC-SCNC: 9 MG/DL (ref 8.6–10.5)
CHLORIDE SERPL-SCNC: 103 MMOL/L (ref 98–107)
CO2 SERPL-SCNC: 26.1 MMOL/L (ref 22–29)
CREAT SERPL-MCNC: 0.83 MG/DL (ref 0.57–1)
EGFRCR SERPLBLD CKD-EPI 2021: 101.7 ML/MIN/1.73
FOLATE SERPL-MCNC: 16.3 NG/ML (ref 4.78–24.2)
GLOBULIN UR ELPH-MCNC: 2.3 GM/DL
GLUCOSE SERPL-MCNC: 58 MG/DL (ref 65–99)
LYMPHOCYTES # BLD MANUAL: 2.57 10*3/MM3 (ref 0.7–3.1)
LYMPHOCYTES NFR BLD MANUAL: 7.1 % (ref 5–12)
MONOCYTES # BLD: 0.3 10*3/MM3 (ref 0.1–0.9)
NEUTROPHILS # BLD AUTO: 1.33 10*3/MM3 (ref 1.7–7)
NEUTROPHILS NFR BLD MANUAL: 31.6 % (ref 42.7–76)
PLAT MORPH BLD: NORMAL
POTASSIUM SERPL-SCNC: 4 MMOL/L (ref 3.5–5.2)
PROT SERPL-MCNC: 6.4 G/DL (ref 6–8.5)
RBC MORPH BLD: NORMAL
SODIUM SERPL-SCNC: 141 MMOL/L (ref 136–145)
T3 SERPL-MCNC: 95.2 NG/DL (ref 80–200)
T4 FREE SERPL-MCNC: 0.97 NG/DL (ref 0.93–1.7)
TSH SERPL DL<=0.05 MIU/L-ACNC: 0.25 UIU/ML (ref 0.27–4.2)
VARIANT LYMPHS NFR BLD MANUAL: 61.2 % (ref 19.6–45.3)
VIT B12 BLD-MCNC: 301 PG/ML (ref 211–946)
WBC MORPH BLD: NORMAL

## 2024-03-02 LAB
TSH RECEP AB SER-ACNC: <1.1 IU/L (ref 0–1.75)
TSI SER-ACNC: <0.1 IU/L (ref 0–0.55)

## 2024-03-07 ENCOUNTER — TELEPHONE (OUTPATIENT)
Dept: INTERNAL MEDICINE | Facility: CLINIC | Age: 24
End: 2024-03-07
Payer: MEDICAID

## 2024-03-17 RX ORDER — ERGOCALCIFEROL 1.25 MG/1
50000 CAPSULE ORAL WEEKLY
Qty: 4 CAPSULE | Refills: 5 | Status: SHIPPED | OUTPATIENT
Start: 2024-03-17

## 2024-03-17 RX ORDER — DIPHENOXYLATE HYDROCHLORIDE AND ATROPINE SULFATE 2.5; .025 MG/1; MG/1
1 TABLET ORAL DAILY
Qty: 30 TABLET | Refills: 11 | Status: SHIPPED | OUTPATIENT
Start: 2024-03-17

## 2024-08-29 ENCOUNTER — OFFICE VISIT (OUTPATIENT)
Dept: OBSTETRICS AND GYNECOLOGY | Facility: CLINIC | Age: 24
End: 2024-08-29
Payer: MEDICAID

## 2024-08-29 VITALS
DIASTOLIC BLOOD PRESSURE: 74 MMHG | BODY MASS INDEX: 27.05 KG/M2 | SYSTOLIC BLOOD PRESSURE: 102 MMHG | WEIGHT: 147 LBS | HEIGHT: 62 IN

## 2024-08-29 DIAGNOSIS — N91.1 AMENORRHEA, SECONDARY: Primary | ICD-10-CM

## 2024-08-29 DIAGNOSIS — N91.3 PRIMARY OLIGOMENORRHEA: ICD-10-CM

## 2024-08-29 DIAGNOSIS — L68.0 FEMALE HIRSUTISM: ICD-10-CM

## 2024-08-29 PROCEDURE — 1160F RVW MEDS BY RX/DR IN RCRD: CPT | Performed by: OBSTETRICS & GYNECOLOGY

## 2024-08-29 PROCEDURE — 99203 OFFICE O/P NEW LOW 30 MIN: CPT | Performed by: OBSTETRICS & GYNECOLOGY

## 2024-08-29 PROCEDURE — 1159F MED LIST DOCD IN RCRD: CPT | Performed by: OBSTETRICS & GYNECOLOGY

## 2024-08-29 NOTE — PROGRESS NOTES
"          Chief Complaint   Patient presents with    Establish Care    Amenorrhea         Subjective   HPI  Elayne Garibay is a 24 y.o. female, , her last LMP was Patient's last menstrual period was 2024 (approximate)..  She presents for an initial evaluation of Amenorrhea. She is new patient.     Pt reports starting periods in high school \"around age 16\". She reports having 4-5 periods per year. She reports periods last 4-5 days. Flow is heavy saturating pad in under an hour for first 2 days. Dysmenorrhea is moderate.     Pt reports periods about Q2-3 months since menarche.    Pt is sexually active. Reports no form of birth control currently.     This has been an issue in the past. The patient reports additional symptoms as none.      Pt denies h/o severe acne/cystic ace.      US was not done today.       Additional OB/GYN History   Last Pap : never   Last Completed Pap Smear       This patient has no relevant Health Maintenance data.          History of abnormal Pap smear:  N/A  Tobacco Usage?: pt vapes daily.             Current Outpatient Medications:     Invega Sustenna 234 MG/1.5ML suspension prefilled syringe IM injection, , Disp: , Rfl:     traZODone (DESYREL) 100 MG tablet, , Disp: , Rfl:      Past Medical History:   Diagnosis Date    Anxiety     Bipolar 1 disorder     Depression     Hyperthyroidism 2023    Schizoaffective disorder         History reviewed. No pertinent surgical history.    The additional following portions of the patient's history were reviewed and updated as appropriate: allergies, current medications, past family history, past medical history, past social history, past surgical history, and problem list.    Review of Systems   Constitutional: Negative.    HENT: Negative.     Eyes: Negative.    Respiratory: Negative.     Cardiovascular: Negative.    Gastrointestinal: Negative.    Endocrine: Negative.    Genitourinary: Negative.  Positive for amenorrhea.   Musculoskeletal: " "Negative.    Skin: Negative.    Allergic/Immunologic: Negative.    Neurological: Negative.    Hematological: Negative.    Psychiatric/Behavioral: Negative.         I have reviewed and agree with the HPI, ROS, and historical information as entered above.     Objective   /74 (BP Location: Right arm)   Ht 157.5 cm (62\")   Wt 66.7 kg (147 lb)   LMP 08/06/2024 (Approximate)   BMI 26.89 kg/m²     Physical Exam  Vitals and nursing note reviewed.   Constitutional:       General: She is not in acute distress.     Appearance: Normal appearance. She is well-developed.   HENT:      Head: Normocephalic and atraumatic.   Cardiovascular:      Rate and Rhythm: Normal rate and regular rhythm.      Heart sounds: Normal heart sounds. No murmur heard.     No friction rub. No gallop.   Pulmonary:      Effort: Pulmonary effort is normal. No respiratory distress or retractions.      Breath sounds: Normal breath sounds. No stridor. No wheezing, rhonchi or rales.   Abdominal:      General: There is no distension.      Palpations: Abdomen is soft. Abdomen is not rigid. There is no mass.      Tenderness: There is no abdominal tenderness. There is no guarding or rebound.      Hernia: No hernia is present.   Skin:     Comments: No acanthosis nigricans present    Mild hirsutism noted on face, abdomen   Neurological:      Mental Status: She is alert and oriented to person, place, and time.   Psychiatric:         Mood and Affect: Mood normal.         Behavior: Behavior normal.         Thought Content: Thought content normal.         Assessment & Plan     Assessment     Problem List Items Addressed This Visit    None  Visit Diagnoses       Amenorrhea, secondary    -  Primary    Relevant Orders    TSH    Prolactin    CBC & Differential    Comprehensive Metabolic Panel    Follicle Stimulating Hormone    Luteinizing Hormone    Testosterone, Free / Tot Equilib    HCG, B-subunit, Quantitative    Primary oligomenorrhea        Relevant Orders    " TSH    Prolactin    CBC & Differential    Comprehensive Metabolic Panel    Follicle Stimulating Hormone    Luteinizing Hormone    Testosterone, Free / Tot Equilib    Female hirsutism        Relevant Orders    DHEA-Sulfate              Plan     Follow Up: Return for Annual physical with u/s.  Patient presents today for evaluation of irregular menses.  Patient reports that her first period was sometime in high school.  She is unsure of the exact age, but believes it was sometime around age 16.  Since menarche, she has had irregular menses and states that they typically occur every 2 to 3 months.  She did recently have amenorrhea for 3 months, which prompted this appointment.  She spontaneously started.  Just prior to her visit.  She is sexually active and not using contraception.  For now she declines contraception.  On history, she does report some mild hirsutism, but denies cystic acne.  She has never been evaluated for her periods before.  Of note, she has been on Risperdal in the past and other psychiatric medications, but these were started after she noticed the irregular periods.  She does have mild hirsutism noted on exam today.  We will proceed with lab evaluation today and she will return for an ultrasound.  We will review results at that time and discuss treatment options  I have recommended condoms for now for contraception as well as STD prevention  Patient has never had a Pap smear, when she returns we will complete a Pap and STD screening.      Dorcas La MD  08/29/2024

## 2024-08-30 DIAGNOSIS — E28.8 HYPERANDROGENISM: ICD-10-CM

## 2024-08-30 DIAGNOSIS — R89.9 ABNORMAL LABORATORY TEST RESULT: Primary | ICD-10-CM

## 2024-08-30 DIAGNOSIS — E22.1 HYPERPROLACTINEMIA: ICD-10-CM

## 2024-08-30 LAB
ALBUMIN SERPL-MCNC: 4.3 G/DL (ref 4–5)
ALP SERPL-CCNC: 51 IU/L (ref 44–121)
ALT SERPL-CCNC: 7 IU/L (ref 0–32)
AST SERPL-CCNC: 11 IU/L (ref 0–40)
BASOPHILS # BLD AUTO: 0 X10E3/UL (ref 0–0.2)
BASOPHILS NFR BLD AUTO: 0 %
BILIRUB SERPL-MCNC: 0.4 MG/DL (ref 0–1.2)
BUN SERPL-MCNC: 5 MG/DL (ref 6–20)
BUN/CREAT SERPL: 6 (ref 9–23)
CALCIUM SERPL-MCNC: 9.2 MG/DL (ref 8.7–10.2)
CHLORIDE SERPL-SCNC: 105 MMOL/L (ref 96–106)
CO2 SERPL-SCNC: 23 MMOL/L (ref 20–29)
CREAT SERPL-MCNC: 0.81 MG/DL (ref 0.57–1)
DHEA-S SERPL-MCNC: >1000 UG/DL (ref 110–431.7)
EGFRCR SERPLBLD CKD-EPI 2021: 104 ML/MIN/1.73
EOSINOPHIL # BLD AUTO: 0 X10E3/UL (ref 0–0.4)
EOSINOPHIL NFR BLD AUTO: 1 %
ERYTHROCYTE [DISTWIDTH] IN BLOOD BY AUTOMATED COUNT: 12.6 % (ref 11.7–15.4)
FSH SERPL-ACNC: 6.4 MIU/ML
GLOBULIN SER CALC-MCNC: 2 G/DL (ref 1.5–4.5)
GLUCOSE SERPL-MCNC: 82 MG/DL (ref 70–99)
HCG INTACT+B SERPL-ACNC: <1 MIU/ML
HCT VFR BLD AUTO: 44.9 % (ref 34–46.6)
HGB BLD-MCNC: 15 G/DL (ref 11.1–15.9)
IMM GRANULOCYTES # BLD AUTO: 0 X10E3/UL (ref 0–0.1)
IMM GRANULOCYTES NFR BLD AUTO: 0 %
LH SERPL-ACNC: 15.7 MIU/ML
LYMPHOCYTES # BLD AUTO: 2.3 X10E3/UL (ref 0.7–3.1)
LYMPHOCYTES NFR BLD AUTO: 48 %
MCH RBC QN AUTO: 32.8 PG (ref 26.6–33)
MCHC RBC AUTO-ENTMCNC: 33.4 G/DL (ref 31.5–35.7)
MCV RBC AUTO: 98 FL (ref 79–97)
MONOCYTES # BLD AUTO: 0.3 X10E3/UL (ref 0.1–0.9)
MONOCYTES NFR BLD AUTO: 6 %
NEUTROPHILS # BLD AUTO: 2.2 X10E3/UL (ref 1.4–7)
NEUTROPHILS NFR BLD AUTO: 45 %
PLATELET # BLD AUTO: 185 X10E3/UL (ref 150–450)
POTASSIUM SERPL-SCNC: 4 MMOL/L (ref 3.5–5.2)
PROLACTIN SERPL-MCNC: 155 NG/ML (ref 4.8–33.4)
PROT SERPL-MCNC: 6.3 G/DL (ref 6–8.5)
RBC # BLD AUTO: 4.57 X10E6/UL (ref 3.77–5.28)
SODIUM SERPL-SCNC: 141 MMOL/L (ref 134–144)
TSH SERPL DL<=0.005 MIU/L-ACNC: 0.24 UIU/ML (ref 0.45–4.5)
WBC # BLD AUTO: 4.9 X10E3/UL (ref 3.4–10.8)

## 2024-08-30 NOTE — PROGRESS NOTES
Please call patient about results.   Her labs show elevated prolactin (this could be caused from her medications, but we need to make sure there isn't a growth in her pituitary gland that is causing this)  Will need MRI of brain to evaluate.  Her DHEA-S is extremely elevated.  Is she taking any steroids, OTC supplements??  We need to evaluate her adrenal glands and make sure there is no abnormal growth that is causing this.  I've put in orders for CT Abdomen and Pelvis.  Can you please make sure they do this quickly (like in the next few days, not 2-3 weeks).  Her testosterone levels are not back yet.  [facility] Counseling: I discussed with the patient the risks of risankizumab-rzaa including but not limited to immunosuppression, and serious infections.  The patient understands that monitoring is required including a PPD at baseline and must alert us or the primary physician if symptoms of infection or other concerning signs are noted.

## 2024-09-04 LAB
TESTOST FREE MFR SERPL: 1.72 % (ref 0.5–2.8)
TESTOST FREE SERPL-MCNC: 1.39 NG/DL (ref 0.1–0.85)
TESTOST SERPL-MCNC: 81 NG/DL (ref 13–71)

## 2024-09-06 ENCOUNTER — HOSPITAL ENCOUNTER (OUTPATIENT)
Facility: HOSPITAL | Age: 24
Discharge: HOME | End: 2024-09-06
Payer: MEDICAID

## 2024-09-06 DIAGNOSIS — E28.8 HYPERANDROGENISM: Primary | ICD-10-CM

## 2024-09-06 DIAGNOSIS — E28.8 HYPERANDROGENISM: ICD-10-CM

## 2024-09-06 DIAGNOSIS — R89.9 ABNORMAL LABORATORY TEST RESULT: ICD-10-CM

## 2024-09-06 PROCEDURE — 25510000001 IOPAMIDOL PER 1 ML: Performed by: OBSTETRICS & GYNECOLOGY

## 2024-09-06 PROCEDURE — 74170 CT ABD WO CNTRST FLWD CNTRST: CPT

## 2024-09-06 RX ORDER — IOPAMIDOL 755 MG/ML
100 INJECTION, SOLUTION INTRAVASCULAR
Status: COMPLETED | OUTPATIENT
Start: 2024-09-06 | End: 2024-09-06

## 2024-09-06 RX ADMIN — IOPAMIDOL 85 ML: 755 INJECTION, SOLUTION INTRAVENOUS at 11:48

## 2024-09-09 DIAGNOSIS — L68.0 FEMALE HIRSUTISM: Primary | ICD-10-CM

## 2024-09-09 NOTE — PROGRESS NOTES
Please call patient about results.   CT scan shows no tumor or mass, it is normal.  If we can get her ultrasound and RGYN appointment moved into September, that would be good.  She's booked in a month.  I'd like to repeat her labs.  Is she taking any OTC supplements, steroids for body building, etc??    Have contact with a person who is taking steroids/testosterone supplements?  Any other meds she wasn't telling us about?   I'm putting in orders to repeat the DHEAS level, she can stop by anytime or at any [facility] lab to repeat.

## 2024-09-11 DIAGNOSIS — N91.1 AMENORRHEA, SECONDARY: Primary | ICD-10-CM

## 2024-09-11 DIAGNOSIS — N91.3 PRIMARY OLIGOMENORRHEA: ICD-10-CM

## 2024-09-12 ENCOUNTER — OFFICE VISIT (OUTPATIENT)
Dept: OBSTETRICS AND GYNECOLOGY | Facility: CLINIC | Age: 24
End: 2024-09-12
Payer: MEDICAID

## 2024-09-12 VITALS
HEIGHT: 62 IN | DIASTOLIC BLOOD PRESSURE: 70 MMHG | SYSTOLIC BLOOD PRESSURE: 100 MMHG | WEIGHT: 145 LBS | BODY MASS INDEX: 26.68 KG/M2

## 2024-09-12 DIAGNOSIS — N91.3 PRIMARY OLIGOMENORRHEA: ICD-10-CM

## 2024-09-12 DIAGNOSIS — Z01.411 ENCOUNTER FOR GYNECOLOGICAL EXAMINATION WITH ABNORMAL FINDING: Primary | ICD-10-CM

## 2024-09-12 DIAGNOSIS — Z11.3 SCREENING EXAMINATION FOR STD (SEXUALLY TRANSMITTED DISEASE): ICD-10-CM

## 2024-09-12 DIAGNOSIS — R89.9 ABNORMAL LABORATORY TEST RESULT: ICD-10-CM

## 2024-09-12 DIAGNOSIS — E28.8 HYPERANDROGENISM: ICD-10-CM

## 2024-09-12 NOTE — PROGRESS NOTES
Gynecologic Annual Exam Note        CC- Here for annual exam.     Subjective     HPI  Elayne Garibay is a 24 y.o. female established patient who presents for annual well woman exam. Patient's last menstrual period was 2024 (approximate)..  Her periods are irregular. .  She reports moderate dysmenorrhea.  SPartner Status: Marital Status: single.  New Partners since last visit: YES/NO/Other: no.  Condom usage with IC: always.    The patient does not have any complaints today.    She exercises regularly: no.  She has concerns about domestic violence: no.    OB History          0    Para   0    Term   0       0    AB   0    Living   0         SAB   0    IAB   0    Ectopic   0    Molar   0    Multiple   0    Live Births   0                Current contraception: condoms  History of abnormal Pap smear: no  Family history of uterine, colon or ovarian cancer: no  Family history of breast cancer: no  H/o STDs: no  Last pap:never this is the first pap   Gardasil:uncertain if she received the vaccine    Last Pap : never  Last Completed Pap Smear       This patient has no relevant Health Maintenance data.            Health Maintenance   Topic Date Due    Annual Gynecologic Pelvic and Breast Exam  Never done    BMI FOLLOWUP  Never done    HPV VACCINES (1 - 3-dose series) Never done    ANNUAL PHYSICAL  Never done    CHLAMYDIA SCREENING  Never done    PAP SMEAR  Never done    TDAP/TD VACCINES (1 - Tdap) Never done    COVID-19 Vaccine ( - -24 season) Never done    INFLUENZA VACCINE  2024    HEPATITIS C SCREENING  Completed    Pneumococcal Vaccine 0-64  Aged Out       The following portions of the patient's history were reviewed and updated as appropriate: allergies, current medications, past family history, past medical history, past social history, past surgical history, and problem list.    Review of Systems  Review of Systems   All other systems reviewed and are negative.      I have  "reviewed and agree with the HPI, ROS, and historical information as entered above. Dorcas La MD      Past Medical History:   Diagnosis Date    Anxiety     Bipolar 1 disorder     Depression     Hyperthyroidism 01/2023    Schizoaffective disorder         History reviewed. No pertinent surgical history.       Objective   /70   Ht 157.5 cm (62\")   Wt 65.8 kg (145 lb)   LMP 08/30/2024 (Approximate)   BMI 26.52 kg/m²     Physical Exam  Vitals and nursing note reviewed. Exam conducted with a chaperone present.   Constitutional:       General: She is awake.   HENT:      Head: Normocephalic and atraumatic.   Neck:      Thyroid: No thyroid mass, thyromegaly or thyroid tenderness.   Cardiovascular:      Rate and Rhythm: Normal rate.      Heart sounds: No murmur heard.     No friction rub. No gallop.   Pulmonary:      Effort: Pulmonary effort is normal.      Breath sounds: Normal breath sounds. No stridor. No wheezing, rhonchi or rales.   Chest:      Chest wall: No mass or tenderness.   Breasts:     Right: Normal. No bleeding, inverted nipple, mass, nipple discharge, skin change or tenderness.      Left: Normal. No bleeding, inverted nipple, mass, nipple discharge, skin change or tenderness.   Abdominal:      Palpations: Abdomen is soft.      Tenderness: There is no guarding or rebound.      Hernia: There is no hernia in the left inguinal area or right inguinal area.   Genitourinary:     General: Normal vulva.      Pubic Area: No rash.       Labia:         Right: No rash, tenderness, lesion or injury.         Left: No rash, tenderness, lesion or injury.       Urethra: No prolapse, urethral swelling or urethral lesion.      Vagina: No foreign body. No vaginal discharge, erythema, tenderness, bleeding or lesions.      Cervix: No cervical motion tenderness, discharge, friability, lesion, erythema or cervical bleeding.      Uterus: Normal. Not deviated, not enlarged, not fixed and not tender.       Adnexa: Right " adnexa normal and left adnexa normal.        Right: No mass, tenderness or fullness.          Left: No mass, tenderness or fullness.        Rectum: No external hemorrhoid.   Musculoskeletal:      Cervical back: Normal range of motion.   Lymphadenopathy:      Upper Body:      Right upper body: No supraclavicular or axillary adenopathy.      Left upper body: No supraclavicular or axillary adenopathy.   Skin:     General: Skin is warm.      Comments: No acanthosis nigricans noted; mild facial, lower abdominal hirsutism noted   Neurological:      Mental Status: She is alert and oriented to person, place, and time.   Psychiatric:         Mood and Affect: Mood and affect normal.         Speech: Speech normal.          Assessment   Assessment  Problem List Items Addressed This Visit    None  Visit Diagnoses       Encounter for gynecological examination with abnormal finding    -  Primary    Relevant Orders    LIQUID-BASED PAP SMEAR WITH HPV GENOTYPING REGARDLESS OF INTERPRETATION ([facility])    Screening examination for STD (sexually transmitted disease)        Relevant Orders    LIQUID-BASED PAP SMEAR WITH HPV GENOTYPING REGARDLESS OF INTERPRETATION ([facility])    Hyperandrogenism        Relevant Orders    Ambulatory Referral to Endocrinology    17-Hydroxyprogesterone    DHEA-Sulfate    Abnormal laboratory test result        Relevant Orders    Ambulatory Referral to Endocrinology    17-Hydroxyprogesterone    DHEA-Sulfate    Primary oligomenorrhea                  Assessment     Problem List Items Addressed This Visit    None  Visit Diagnoses       Encounter for gynecological examination with abnormal finding    -  Primary    Relevant Orders    LIQUID-BASED PAP SMEAR WITH HPV GENOTYPING REGARDLESS OF INTERPRETATION ([facility])    Screening examination for STD (sexually transmitted disease)        Relevant Orders    LIQUID-BASED PAP SMEAR WITH HPV GENOTYPING REGARDLESS OF INTERPRETATION ([facility])     Hyperandrogenism        Relevant Orders    Ambulatory Referral to Endocrinology    17-Hydroxyprogesterone    DHEA-Sulfate    Abnormal laboratory test result        Relevant Orders    Ambulatory Referral to Endocrinology    17-Hydroxyprogesterone    DHEA-Sulfate    Primary oligomenorrhea                  Plan     Encouraged use of condoms for STD prevention.  Reviewed monthly self breast exams.  Instructed to call with lumps, pain, or breast discharge.    Unsure if she's had Gardasil, will check with mother.   Declines contraception for now.   Pt underwent TVUS today, ovaries are polycystic bilaterally, but no mass or other abnormality noted.    Pt with severely elevated DHEA-S  and normal adrenal CT.  Has upcoming appointment for MRI for hyperprolactinemia.  Will repeat DHEA-S today and referral to Endocrinology for further evaluation.    Testosterone was slightly elevated c/w PCOS.           Dorcas La MD  09/12/2024

## 2024-09-13 LAB — DHEA-S SERPL-MCNC: >1000 UG/DL (ref 110–431.7)

## 2024-09-16 ENCOUNTER — TELEPHONE (OUTPATIENT)
Dept: OBSTETRICS AND GYNECOLOGY | Facility: CLINIC | Age: 24
End: 2024-09-16

## 2024-09-16 NOTE — PROGRESS NOTES
Please call patient about results.   Her level is still severely elevated.  Has she heard from Endocrinology yet??  She definitely needs to follow up with them.

## 2024-09-18 LAB
17OHP SERPL-MCNC: 34 NG/DL
REF LAB TEST METHOD: NORMAL

## 2024-09-25 ENCOUNTER — TELEPHONE (OUTPATIENT)
Dept: OBSTETRICS AND GYNECOLOGY | Facility: CLINIC | Age: 24
End: 2024-09-25
Payer: MEDICAID